# Patient Record
(demographics unavailable — no encounter records)

---

## 2025-07-10 NOTE — HISTORY OF PRESENT ILLNESS
[de-identified] : right ear pain painful to touch per mom sibling with swimmers ear last week dad used drops last night , mom would also like mouth checked pt was using mouth  last night and injured left side out mouth with pointed side of stick  [FreeTextEntry6] : complaining right ear pain, tender to touch has been swimming a lot no other symptoms no cough/congestion/vomiting/rash/sore throat also poked self in back of mouth with disposable  pick yesterday per mom it looked like there was a cut but today she doesn't see it, no swelling, some tenderness to the area per patient  [employer]

## 2025-07-10 NOTE — PHYSICAL EXAM
[TextEntry] : General: awake, alert, cooperative, appropriate, no acute distress Head: no signs injury Eyes: EOMI, PERRL, no discharge, no conjunctival or scleral erythema  Ears: tenderness on movement of the pinna on the right with swelling to the canal, tm difficult to see, left ear WNL Nose: no rhinorrhea, no inflamed nasal turbinates bilaterally Mouth: mucosa moist and pink, no erythema to the oropharynx, no vesicles, lesions or soft palate petechiae, no injury noted to posterior oropharynx mom agrees what she saw yesterday is no longer there Neck: supple, good range of motion Lungs: clear to auscultation bilaterally  Cardiac: normal S1 S2, regular rate and rhythm Abdomen: soft, non tender, non distended Lymphatics: no cervical lymphadenopathy, no pre or post auricular lymphadenopathy, no occipital lymphadenopathy Skin: no rash

## 2025-07-10 NOTE — HISTORY OF PRESENT ILLNESS
[de-identified] : right ear pain painful to touch per mom sibling with swimmers ear last week dad used drops last night , mom would also like mouth checked pt was using mouth  last night and injured left side out mouth with pointed side of stick  [FreeTextEntry6] : complaining right ear pain, tender to touch has been swimming a lot no other symptoms no cough/congestion/vomiting/rash/sore throat also poked self in back of mouth with disposable  pick yesterday per mom it looked like there was a cut but today she doesn't see it, no swelling, some tenderness to the area per patient

## 2025-07-10 NOTE — PLAN
[TextEntry] : Ear drops are usually prescribed to reduce pain and swelling caused by external otitis. It is important to apply the ear drops correctly so that they reach the ear canal: -Lie on patient side or tilt head towards the opposite shoulder. -Place the ear drops in the ear canal. -Lie on side for 20 minutes During treatment, avoid getting the inside of ears wet. While bathing, place a cotton ball coated with petroleum jelly in the ear. Do not swim for 7 to 10 days after starting treatment. Avoid wearing hearing aids and in-ear headphones until pain improves.  no visible injury to the mouth, watchful waiting if any continued pain, any swelling, any trouble swallowing/fever or any concerns at all to follow up asap or f/u oral surgeon